# Patient Record
Sex: FEMALE | Race: BLACK OR AFRICAN AMERICAN | NOT HISPANIC OR LATINO | URBAN - METROPOLITAN AREA
[De-identification: names, ages, dates, MRNs, and addresses within clinical notes are randomized per-mention and may not be internally consistent; named-entity substitution may affect disease eponyms.]

---

## 2017-05-04 PROBLEM — 102614006 GENERALIZED ABDOMINAL PAIN: Status: INACTIVE | Noted: 2017-05-04

## 2017-05-04 PROBLEM — 110483000 TOBACCO USER: Status: ACTIVE | Noted: 2017-05-04

## 2018-11-12 PROBLEM — 79922009 EPIGASTRIC PAIN: Status: ACTIVE | Noted: 2018-11-12

## 2018-11-12 PROBLEM — 133621000119103 LONG TERM CURRENT USE OF NON-STEROIDAL ANTI-INFLAMMATORY DRUG: Status: INACTIVE | Noted: 2018-11-12

## 2018-11-12 PROBLEM — 40739000 DYSPHAGIA: Status: ACTIVE | Noted: 2018-11-12

## 2019-01-10 PROBLEM — 266435005 GASTRO-ESOPHAGEAL REFLUX DISEASE WITHOUT ESOPHAGITIS: Status: ACTIVE | Noted: 2019-01-10

## 2019-12-18 PROBLEM — 166603001 ABNORMAL RESULTS OF LIVER FUNCTION STUDIES: Status: ACTIVE | Noted: 2019-12-18

## 2020-08-06 ENCOUNTER — OFFICE VISIT (OUTPATIENT)
Dept: URBAN - METROPOLITAN AREA CLINIC 27 | Facility: CLINIC | Age: 70
End: 2020-08-06

## 2020-08-06 PROBLEM — 275978004 SCREENING FOR MALIGNANT NEOPLASM OF COLON: Status: ACTIVE | Noted: 2020-08-06

## 2020-08-06 PROBLEM — 14760008 CONSTIPATION: Status: ACTIVE | Noted: 2020-08-06

## 2020-08-07 ENCOUNTER — LAB OUTSIDE AN ENCOUNTER (OUTPATIENT)
Dept: URBAN - METROPOLITAN AREA CLINIC 121 | Facility: CLINIC | Age: 70
End: 2020-08-07

## 2020-08-07 LAB
A/G RATIO: (no result)
ALBUMIN: 4.1
ALK PHOS: 80
BILI DIRECT: 0.1
BILI INDIREC: (no result)
BILI TOTAL: 0.4
GLOBULIN TOT: (no result)
PROTEIN, TOT: 6.6
SGOT (AST): 18
SGPT (ALT): 13

## 2020-09-15 ENCOUNTER — OFFICE VISIT (OUTPATIENT)
Dept: URBAN - METROPOLITAN AREA SURGERY CENTER 7 | Facility: SURGERY CENTER | Age: 70
End: 2020-09-15

## 2022-04-30 ENCOUNTER — TELEPHONE ENCOUNTER (OUTPATIENT)
Dept: URBAN - METROPOLITAN AREA CLINIC 121 | Facility: CLINIC | Age: 72
End: 2022-04-30

## 2022-04-30 RX ORDER — OMEPRAZOLE 40 MG/1
1 CAPSULE PO BID CAPSULE, DELAYED RELEASE ORAL
OUTPATIENT
Start: 2017-05-08

## 2022-04-30 RX ORDER — HYOSCYAMINE SULFATE 0.12 MG/1
TAKE ONE OR TWO TABLETS BY MOUTH EVERY 6HR AS NEEDED FOR ABDOMINAL PAIN TABLET ORAL
OUTPATIENT
Start: 2019-01-10 | End: 2020-08-06

## 2022-04-30 RX ORDER — HYOSCYAMINE SULFATE 0.12 MG/1
TAKE ONE OR TWO TABLETS BY MOUTH EVERY 6HR AS NEEDED FOR ABDOMINAL PAIN TABLET ORAL
OUTPATIENT
Start: 2019-01-10

## 2022-04-30 RX ORDER — OMEPRAZOLE 40 MG/1
1 CAPSULE PO BID CAPSULE, DELAYED RELEASE ORAL
OUTPATIENT
Start: 2017-05-08 | End: 2019-01-10

## 2022-05-01 ENCOUNTER — TELEPHONE ENCOUNTER (OUTPATIENT)
Dept: URBAN - METROPOLITAN AREA CLINIC 121 | Facility: CLINIC | Age: 72
End: 2022-05-01

## 2022-05-01 RX ORDER — LISINOPRIL 10 MG/1
TABLET ORAL
Status: ACTIVE | COMMUNITY
Start: 2017-05-08

## 2022-05-01 RX ORDER — PANTOPRAZOLE SODIUM 40 MG/1
TAKE ONE TABLET BY MOUTH TWICE DAILY TABLET, DELAYED RELEASE ORAL
Status: ACTIVE | COMMUNITY
Start: 2019-01-10

## 2022-05-01 RX ORDER — SUCRALFATE 1 G/10ML
DRINK 10 MILLILITERS BEFORE EACH MEAL AND BEDTIME SUSPENSION ORAL
Status: ACTIVE | COMMUNITY
Start: 2019-01-10

## 2022-05-01 RX ORDER — PROMETHAZINE HYDROCHLORIDE 25 MG/1
TAKE 1 TABLET PO Q6H PRN NAUSEA TABLET ORAL
Status: ACTIVE | COMMUNITY
Start: 2019-02-22

## 2022-05-01 RX ORDER — DICYCLOMINE HYDROCHLORIDE 20 MG/1
1-2 TABLETS PO Q6H PRN TABLET ORAL
Status: ACTIVE | COMMUNITY
Start: 2019-12-19

## 2023-12-07 ENCOUNTER — DASHBOARD ENCOUNTERS (OUTPATIENT)
Age: 73
End: 2023-12-07

## 2023-12-07 ENCOUNTER — TELEPHONE ENCOUNTER (OUTPATIENT)
Dept: URBAN - METROPOLITAN AREA CLINIC 27 | Facility: CLINIC | Age: 73
End: 2023-12-07

## 2023-12-07 ENCOUNTER — LAB OUTSIDE AN ENCOUNTER (OUTPATIENT)
Dept: URBAN - METROPOLITAN AREA CLINIC 27 | Facility: CLINIC | Age: 73
End: 2023-12-07

## 2023-12-07 ENCOUNTER — OFFICE VISIT (OUTPATIENT)
Dept: URBAN - METROPOLITAN AREA CLINIC 27 | Facility: CLINIC | Age: 73
End: 2023-12-07
Payer: MEDICARE

## 2023-12-07 VITALS
DIASTOLIC BLOOD PRESSURE: 71 MMHG | SYSTOLIC BLOOD PRESSURE: 145 MMHG | HEART RATE: 52 BPM | WEIGHT: 140 LBS | BODY MASS INDEX: 23.32 KG/M2 | HEIGHT: 65 IN

## 2023-12-07 DIAGNOSIS — I10 HYPERTENSION: ICD-10-CM

## 2023-12-07 DIAGNOSIS — F41.8 ANXIETY WITH DEPRESSION: ICD-10-CM

## 2023-12-07 DIAGNOSIS — R10.13 EPIGASTRIC ABDOMINAL PAIN: ICD-10-CM

## 2023-12-07 DIAGNOSIS — R63.4 WEIGHT LOSS: ICD-10-CM

## 2023-12-07 DIAGNOSIS — R11.0 NAUSEA: ICD-10-CM

## 2023-12-07 DIAGNOSIS — Z72.0 TOBACCO ABUSE: ICD-10-CM

## 2023-12-07 DIAGNOSIS — K21.9 GERD: ICD-10-CM

## 2023-12-07 DIAGNOSIS — Z86.010 HX OF ADENOMATOUS POLYP OF COLON: ICD-10-CM

## 2023-12-07 PROBLEM — 422587007 NAUSEA: Status: ACTIVE | Noted: 2017-05-04

## 2023-12-07 PROCEDURE — 99205 OFFICE O/P NEW HI 60 MIN: CPT | Performed by: INTERNAL MEDICINE

## 2023-12-07 RX ORDER — LISINOPRIL 10 MG/1
TABLET ORAL
Status: ACTIVE | COMMUNITY
Start: 2017-05-08

## 2023-12-07 RX ORDER — PANTOPRAZOLE SODIUM 40 MG/1
TAKE ONE TABLET BY MOUTH TWICE DAILY TABLET, DELAYED RELEASE ORAL TWICE A DAY
Qty: 60 TABLET | Refills: 5
Start: 2019-01-10

## 2023-12-07 RX ORDER — PANTOPRAZOLE SODIUM 40 MG/1
TAKE ONE TABLET BY MOUTH TWICE DAILY TABLET, DELAYED RELEASE ORAL
Status: ON HOLD | COMMUNITY
Start: 2019-01-10

## 2023-12-07 RX ORDER — PROMETHAZINE HYDROCHLORIDE 25 MG/1
TAKE 1 TABLET PO Q6H PRN NAUSEA TABLET ORAL
Status: ACTIVE | COMMUNITY
Start: 2019-02-22

## 2023-12-07 RX ORDER — DICYCLOMINE HYDROCHLORIDE 20 MG/1
1-2 TABLETS TABLET ORAL THREE TIMES A DAY
Qty: 90 | Refills: 5
Start: 2019-12-19 | End: 2025-05-30

## 2023-12-07 RX ORDER — DICYCLOMINE HYDROCHLORIDE 20 MG/1
1-2 TABLETS PO Q6H PRN TABLET ORAL
Status: ON HOLD | COMMUNITY
Start: 2019-12-19

## 2023-12-07 RX ORDER — SUCRALFATE 1 G/10ML
DRINK 10 MILLILITERS BEFORE EACH MEAL AND BEDTIME SUSPENSION ORAL
Status: ON HOLD | COMMUNITY
Start: 2019-01-10

## 2023-12-07 RX ORDER — SUCRALFATE 1 G/10ML
10 ML 1 HOUR BEFORE MEALS AND AT BEDTIME ON AN EMPTY STOMACH SUSPENSION ORAL
Qty: 1200 | Refills: 5
Start: 2019-01-10 | End: 2025-05-30

## 2023-12-07 NOTE — HPI-TODAY'S VISIT:
Patient here with multiple GI complaints that have been worsening over the past month or so. She has had fairly severe lower epigastric abdominal pain which waxes and wanes. It is crampy and sometimes nocturnal. It is worse with standing, and better when she sits or drinks milk. She is supposed to be taking pantoprazole, dicyclomine, and Carafate slurry but has not taken any of these medications in at least the past few months because "I ran out". She has intermittent reflux symptoms for which the pantoprazole was previously helpful. She has lost 20 pounds over the past year. She has intermittent nausea but no vomiting. Her stools have been somewhat irregular--both loose and mild constipation. She apparently stopped smoking ?2 months ago (she is somewhat vague about the timing). She has not had any recent labs. Her last colonoscopy was in October 2020; 2 benign polyps were removed. The prep was somewhat suboptimal. She has not had any recent imaging. She has not tried drinking Boost or Ensure.   She continues to have poorly-controlled depression and anxiety. Her last upper endoscopy was in 2019; mild erosive antral gastritis and duodenitis were present. The remainder of the exam was essentially unremarkable, and gastric biopsieswere negative for HP. There is no family history of colon cancer or polyps. She has a history of a small bowel obstruction secondary to pelvic adhesions for which she underwent ex-lap with SADIE in 2014. She has tried stopping (either intentionally or unintentionally) the above medications multiple times in the past, and soon thereafter doing so, her symptoms (ieabdominal pain, nausea/vomiting, etc) always recur.

## 2023-12-07 NOTE — PHYSICAL EXAM GASTROINTESTINAL
Abdomen is soft, mild/moderate TTP epigastrium, nondistended, no guarding or rigidity, no masses palpable, normal bowel sounds

## 2023-12-08 ENCOUNTER — LAB OUTSIDE AN ENCOUNTER (OUTPATIENT)
Dept: URBAN - METROPOLITAN AREA CLINIC 27 | Facility: CLINIC | Age: 73
End: 2023-12-08

## 2023-12-08 LAB
A/G RATIO: 1.7
ABSOLUTE BASOPHILS: 47
ABSOLUTE EOSINOPHILS: 140
ABSOLUTE LYMPHOCYTES: 2976
ABSOLUTE MONOCYTES: 688
ABSOLUTE NEUTROPHILS: 5450
ALBUMIN: 4.3
ALKALINE PHOSPHATASE: 82
ALT (SGPT): 15
AMYLASE: 50
AST (SGOT): 18
BASOPHILS: 0.5
BILIRUBIN, TOTAL: 0.6
BUN/CREATININE RATIO: (no result)
BUN: 11
C-REACTIVE PROTEIN, QUANT: 0.9
CALCIUM: 9.5
CARBON DIOXIDE, TOTAL: 26
CHLORIDE: 106
CREATININE: 0.7
EGFR: 90
EOSINOPHILS: 1.5
GLOBULIN, TOTAL: 2.5
GLUCOSE: 90
HEMATOCRIT: 41.1
HEMOGLOBIN: 13.7
LIPASE: 16
LYMPHOCYTES: 32
MCH: 30.7
MCHC: 33.3
MCV: 92.2
MONOCYTES: 7.4
MPV: 10.8
NEUTROPHILS: 58.6
PLATELET COUNT: 290
POTASSIUM: 4
PROTEIN, TOTAL: 6.8
RDW: 12.6
RED BLOOD CELL COUNT: 4.46
SED RATE BY MODIFIED: 2
SODIUM: 140
TSH: 1.65
WHITE BLOOD CELL COUNT: 9.3

## 2023-12-11 ENCOUNTER — OUT OF OFFICE VISIT (OUTPATIENT)
Dept: URBAN - METROPOLITAN AREA SURGERY CENTER 7 | Facility: SURGERY CENTER | Age: 73
End: 2023-12-11
Payer: MEDICARE

## 2023-12-11 ENCOUNTER — TELEPHONE ENCOUNTER (OUTPATIENT)
Dept: URBAN - METROPOLITAN AREA CLINIC 27 | Facility: CLINIC | Age: 73
End: 2023-12-11

## 2023-12-11 ENCOUNTER — CLAIMS CREATED FROM THE CLAIM WINDOW (OUTPATIENT)
Dept: URBAN - METROPOLITAN AREA CLINIC 4 | Facility: CLINIC | Age: 73
End: 2023-12-11
Payer: MEDICARE

## 2023-12-11 DIAGNOSIS — D13.2 BENIGN NEOPLASM OF DUODENUM: ICD-10-CM

## 2023-12-11 DIAGNOSIS — K44.9 HIATAL HERNIA: ICD-10-CM

## 2023-12-11 DIAGNOSIS — R10.13 ABDOMINAL DISCOMFORT, EPIGASTRIC: ICD-10-CM

## 2023-12-11 DIAGNOSIS — K31.9 MUCOSAL ABNORMALITY OF DUODENUM: ICD-10-CM

## 2023-12-11 DIAGNOSIS — K29.80 ACUTE DUODENITIS: ICD-10-CM

## 2023-12-11 DIAGNOSIS — K31.7 POLYP OF STOMACH AND DUODENUM: ICD-10-CM

## 2023-12-11 DIAGNOSIS — K29.70 GASTRIC INFLAMMATION: ICD-10-CM

## 2023-12-11 DIAGNOSIS — R10.13 EPIGASTRIC ABDOMINAL PAIN: ICD-10-CM

## 2023-12-11 PROCEDURE — 43239 EGD BIOPSY SINGLE/MULTIPLE: CPT | Performed by: INTERNAL MEDICINE

## 2023-12-11 PROCEDURE — 00731 ANES UPR GI NDSC PX NOS: CPT | Performed by: ANESTHESIOLOGY

## 2023-12-11 PROCEDURE — 88305 TISSUE EXAM BY PATHOLOGIST: CPT | Performed by: PATHOLOGY

## 2023-12-11 PROCEDURE — G8907 PT DOC NO EVENTS ON DISCHARG: HCPCS | Performed by: INTERNAL MEDICINE

## 2023-12-11 RX ORDER — DICYCLOMINE HYDROCHLORIDE 20 MG/1
1-2 TABLETS TABLET ORAL THREE TIMES A DAY
Qty: 90 | Refills: 5 | Status: ACTIVE | COMMUNITY
Start: 2019-12-19 | End: 2025-05-30

## 2023-12-11 RX ORDER — SUCRALFATE 1 G/10ML
10 ML 1 HOUR BEFORE MEALS AND AT BEDTIME ON AN EMPTY STOMACH SUSPENSION ORAL
Qty: 1200 | Refills: 5 | Status: ACTIVE | COMMUNITY
Start: 2019-01-10 | End: 2025-05-30

## 2023-12-11 RX ORDER — PROMETHAZINE HYDROCHLORIDE 25 MG/1
TAKE 1 TABLET PO Q6H PRN NAUSEA TABLET ORAL
Status: ACTIVE | COMMUNITY
Start: 2019-02-22

## 2023-12-11 RX ORDER — PANTOPRAZOLE SODIUM 40 MG/1
TAKE ONE TABLET BY MOUTH TWICE DAILY TABLET, DELAYED RELEASE ORAL TWICE A DAY
Qty: 60 TABLET | Refills: 5 | Status: ACTIVE | COMMUNITY
Start: 2019-01-10

## 2023-12-11 RX ORDER — LISINOPRIL 10 MG/1
TABLET ORAL
Status: ACTIVE | COMMUNITY
Start: 2017-05-08